# Patient Record
Sex: MALE | Race: WHITE | Employment: UNEMPLOYED | URBAN - METROPOLITAN AREA
[De-identification: names, ages, dates, MRNs, and addresses within clinical notes are randomized per-mention and may not be internally consistent; named-entity substitution may affect disease eponyms.]

---

## 2019-02-13 ENCOUNTER — HOSPITAL ENCOUNTER (OUTPATIENT)
Dept: GENERAL RADIOLOGY | Age: 44
Discharge: HOME | End: 2019-02-13
Payer: COMMERCIAL

## 2019-02-13 DIAGNOSIS — R05.9 COUGH: ICD-10-CM

## 2019-02-13 PROCEDURE — 71046 X-RAY EXAM CHEST 2 VIEWS: CPT

## 2022-06-10 ENCOUNTER — HOSPITAL ENCOUNTER (OUTPATIENT)
Age: 47
Discharge: HOME | End: 2022-06-10
Payer: COMMERCIAL

## 2022-06-10 ENCOUNTER — HOSPITAL ENCOUNTER (OUTPATIENT)
Dept: GENERAL RADIOLOGY | Age: 47
Discharge: HOME | End: 2022-06-10
Payer: COMMERCIAL

## 2022-06-10 DIAGNOSIS — G89.29 CHRONIC MIDLINE LOW BACK PAIN WITHOUT SCIATICA: ICD-10-CM

## 2022-06-10 DIAGNOSIS — M54.50 CHRONIC MIDLINE LOW BACK PAIN WITHOUT SCIATICA: ICD-10-CM

## 2022-06-10 PROCEDURE — 72100 X-RAY EXAM L-S SPINE 2/3 VWS: CPT

## 2022-06-10 PROCEDURE — 71046 X-RAY EXAM CHEST 2 VIEWS: CPT

## 2022-06-23 ENCOUNTER — HOSPITAL ENCOUNTER (OUTPATIENT)
Dept: PULMONOLOGY | Age: 47
Discharge: HOME | End: 2022-06-23
Payer: COMMERCIAL

## 2022-06-23 VITALS — OXYGEN SATURATION: 95 %

## 2022-06-23 DIAGNOSIS — G89.29 CHRONIC MIDLINE LOW BACK PAIN WITHOUT SCIATICA: ICD-10-CM

## 2022-06-23 DIAGNOSIS — M54.50 CHRONIC MIDLINE LOW BACK PAIN WITHOUT SCIATICA: ICD-10-CM

## 2022-06-23 LAB
EXPIRATORY TIME-POST: NORMAL
EXPIRATORY TIME: NORMAL
FEF 25-75% %CHNG: NORMAL
FEF 25-75% %PRED-POST: NORMAL
FEF 25-75% %PRED-PRE: NORMAL
FEF 25-75% PRED: NORMAL
FEF 25-75%-POST: NORMAL
FEF 25-75%-PRE: NORMAL
FEV1 %PRED-POST: 59 %
FEV1 %PRED-PRE: 69 %
FEV1 PRED: NORMAL
FEV1-POST: NORMAL
FEV1-PRE: NORMAL
FEV1/FVC %PRED-POST: NORMAL
FEV1/FVC %PRED-PRE: NORMAL
FEV1/FVC PRED: NORMAL
FEV1/FVC-POST: 84 %
FEV1/FVC-PRE: 82 %
FVC %PRED-POST: NORMAL
FVC %PRED-PRE: NORMAL
FVC PRED: NORMAL
FVC-POST: NORMAL
FVC-PRE: NORMAL
PEF %PRED-POST: NORMAL
PEF %PRED-PRE: NORMAL
PEF PRED: NORMAL
PEF%CHNG: NORMAL
PEF-POST: NORMAL
PEF-PRE: NORMAL

## 2022-06-23 PROCEDURE — 94760 N-INVAS EAR/PLS OXIMETRY 1: CPT

## 2022-06-23 PROCEDURE — 6370000000 HC RX 637 (ALT 250 FOR IP): Performed by: PHYSICIAN ASSISTANT

## 2022-06-23 PROCEDURE — 94640 AIRWAY INHALATION TREATMENT: CPT

## 2022-06-23 PROCEDURE — 94060 EVALUATION OF WHEEZING: CPT

## 2022-06-23 RX ORDER — ALBUTEROL SULFATE 90 UG/1
4 AEROSOL, METERED RESPIRATORY (INHALATION) ONCE
Status: COMPLETED | OUTPATIENT
Start: 2022-06-23 | End: 2022-06-23

## 2022-06-23 RX ADMIN — Medication 4 PUFF: at 13:40

## 2022-06-23 ASSESSMENT — PULMONARY FUNCTION TESTS
FEV1/FVC_POST: 84
FEV1_PERCENT_PREDICTED_POST: 59
FEV1/FVC_PRE: 82
FEV1_PERCENT_PREDICTED_PRE: 69

## 2022-06-28 PROCEDURE — 94060 EVALUATION OF WHEEZING: CPT | Performed by: INTERNAL MEDICINE

## 2022-06-28 NOTE — PROCEDURES
[address]                 [address] [address]                               PULMONARY FUNCTION    PATIENT NAME: Chiki Cotto                    :        1975  MED REC NO:   [id number]                          ROOM:  ACCOUNT NO:   [de-identified]                           ADMIT DATE: 2022  PROVIDER:     Nisha Young MD    DATE OF PROCEDURE:  2022    FINDINGS:  1. Spirometry:  FVC 3.13, which is 67 of predicted. FEV1 is 2.57,  which is 68 of predicted. No bronchodilator response. FEV1/FVC of 81. IMPRESSION:  This is nonspecific spirometry. For further evaluation,  clinical and radiological correlation indicated. Full PFT needed. There is no bronchodilator response.         Zoltan Felder MD    D: 2022 13:00:53       T: 2022 22:49:18     [id number]  Job#: [id number]     Doc#: [id number]    CC:

## 2022-09-15 ENCOUNTER — HOSPITAL ENCOUNTER (OUTPATIENT)
Dept: GENERAL RADIOLOGY | Age: 47
Discharge: HOME | End: 2022-09-15
Payer: COMMERCIAL

## 2022-09-15 DIAGNOSIS — R07.81 PLEURITIC CHEST PAIN: ICD-10-CM

## 2022-09-15 PROCEDURE — 71046 X-RAY EXAM CHEST 2 VIEWS: CPT

## 2022-09-23 ENCOUNTER — HOSPITAL ENCOUNTER (OUTPATIENT)
Dept: GENERAL RADIOLOGY | Age: 47
Discharge: HOME | End: 2022-09-23
Payer: COMMERCIAL

## 2022-09-23 ENCOUNTER — HOSPITAL ENCOUNTER (OUTPATIENT)
Age: 47
Discharge: HOME | End: 2022-09-23
Payer: COMMERCIAL

## 2022-09-23 DIAGNOSIS — R07.81 ANTERIOR PLEURITIC PAIN: ICD-10-CM

## 2022-09-23 PROCEDURE — 71046 X-RAY EXAM CHEST 2 VIEWS: CPT

## 2022-10-08 ENCOUNTER — HOSPITAL ENCOUNTER (OUTPATIENT)
Dept: CT IMAGING | Age: 47
Discharge: HOME | End: 2022-10-08
Payer: COMMERCIAL

## 2022-10-08 DIAGNOSIS — S22.31XA CLOSED TRAUMATIC DISPLACED FRACTURE OF RIB ON RIGHT SIDE, INITIAL ENCOUNTER: ICD-10-CM

## 2022-10-08 PROCEDURE — 71250 CT THORAX DX C-: CPT

## 2022-10-21 ENCOUNTER — HOSPITAL ENCOUNTER (EMERGENCY)
Age: 47
Discharge: HOME | End: 2022-10-21
Attending: STUDENT IN AN ORGANIZED HEALTH CARE EDUCATION/TRAINING PROGRAM
Payer: COMMERCIAL

## 2022-10-21 ENCOUNTER — APPOINTMENT (OUTPATIENT)
Dept: CT IMAGING | Age: 47
End: 2022-10-21
Payer: COMMERCIAL

## 2022-10-21 ENCOUNTER — APPOINTMENT (OUTPATIENT)
Dept: GENERAL RADIOLOGY | Age: 47
End: 2022-10-21
Payer: COMMERCIAL

## 2022-10-21 VITALS
SYSTOLIC BLOOD PRESSURE: 130 MMHG | WEIGHT: 197 LBS | BODY MASS INDEX: 30.92 KG/M2 | HEART RATE: 82 BPM | DIASTOLIC BLOOD PRESSURE: 92 MMHG | RESPIRATION RATE: 18 BRPM | OXYGEN SATURATION: 96 % | TEMPERATURE: 97.9 F | HEIGHT: 67 IN

## 2022-10-21 DIAGNOSIS — I10 ESSENTIAL HYPERTENSION: ICD-10-CM

## 2022-10-21 DIAGNOSIS — R51.9 ACUTE NONINTRACTABLE HEADACHE, UNSPECIFIED HEADACHE TYPE: Primary | ICD-10-CM

## 2022-10-21 LAB
A/G RATIO: 1.6 (ref 1.1–2.2)
ALBUMIN SERPL-MCNC: 4.4 G/DL (ref 3.4–5)
ALP BLD-CCNC: 150 U/L (ref 40–129)
ALT SERPL-CCNC: 41 U/L (ref 10–40)
ANION GAP SERPL CALCULATED.3IONS-SCNC: 10 MMOL/L (ref 3–16)
AST SERPL-CCNC: 26 U/L (ref 15–37)
BASOPHILS ABSOLUTE: 0.1 K/UL (ref 0–0.2)
BASOPHILS RELATIVE PERCENT: 0.9 %
BILIRUB SERPL-MCNC: 0.4 MG/DL (ref 0–1)
BUN BLDV-MCNC: 12 MG/DL (ref 7–20)
CALCIUM SERPL-MCNC: 9.5 MG/DL (ref 8.3–10.6)
CHLORIDE BLD-SCNC: 104 MMOL/L (ref 99–110)
CO2: 24 MMOL/L (ref 21–32)
CREAT SERPL-MCNC: 0.8 MG/DL (ref 0.9–1.3)
EKG ATRIAL RATE: 87 BPM
EKG DIAGNOSIS: NORMAL
EKG P AXIS: 50 DEGREES
EKG P-R INTERVAL: 170 MS
EKG Q-T INTERVAL: 364 MS
EKG QRS DURATION: 90 MS
EKG QTC CALCULATION (BAZETT): 438 MS
EKG R AXIS: 12 DEGREES
EKG T AXIS: 28 DEGREES
EKG VENTRICULAR RATE: 87 BPM
EOSINOPHILS ABSOLUTE: 0.4 K/UL (ref 0–0.6)
EOSINOPHILS RELATIVE PERCENT: 4.6 %
GFR SERPL CREATININE-BSD FRML MDRD: >60 ML/MIN/{1.73_M2}
GLUCOSE BLD-MCNC: 103 MG/DL (ref 70–99)
HCT VFR BLD CALC: 42.2 % (ref 40.5–52.5)
HEMOGLOBIN: 14.3 G/DL (ref 13.5–17.5)
INFLUENZA A: NOT DETECTED
INFLUENZA B: NOT DETECTED
LYMPHOCYTES ABSOLUTE: 2.4 K/UL (ref 1–5.1)
LYMPHOCYTES RELATIVE PERCENT: 26.5 %
MCH RBC QN AUTO: 30 PG (ref 26–34)
MCHC RBC AUTO-ENTMCNC: 34 G/DL (ref 31–36)
MCV RBC AUTO: 88.2 FL (ref 80–100)
MONOCYTES ABSOLUTE: 0.9 K/UL (ref 0–1.3)
MONOCYTES RELATIVE PERCENT: 10 %
NEUTROPHILS ABSOLUTE: 5.2 K/UL (ref 1.7–7.7)
NEUTROPHILS RELATIVE PERCENT: 58 %
PDW BLD-RTO: 14.1 % (ref 12.4–15.4)
PLATELET # BLD: 363 K/UL (ref 135–450)
PMV BLD AUTO: 7.1 FL (ref 5–10.5)
POTASSIUM REFLEX MAGNESIUM: 4 MMOL/L (ref 3.5–5.1)
RBC # BLD: 4.78 M/UL (ref 4.2–5.9)
SARS-COV-2 RNA, RT PCR: NOT DETECTED
SODIUM BLD-SCNC: 138 MMOL/L (ref 136–145)
TOTAL PROTEIN: 7.2 G/DL (ref 6.4–8.2)
TROPONIN: <0.01 NG/ML
WBC # BLD: 9 K/UL (ref 4–11)

## 2022-10-21 PROCEDURE — 2500000003 HC RX 250 WO HCPCS: Performed by: STUDENT IN AN ORGANIZED HEALTH CARE EDUCATION/TRAINING PROGRAM

## 2022-10-21 PROCEDURE — 70450 CT HEAD/BRAIN W/O DYE: CPT

## 2022-10-21 PROCEDURE — 99285 EMERGENCY DEPT VISIT HI MDM: CPT

## 2022-10-21 PROCEDURE — 84484 ASSAY OF TROPONIN QUANT: CPT

## 2022-10-21 PROCEDURE — 36415 COLL VENOUS BLD VENIPUNCTURE: CPT

## 2022-10-21 PROCEDURE — 93005 ELECTROCARDIOGRAM TRACING: CPT | Performed by: STUDENT IN AN ORGANIZED HEALTH CARE EDUCATION/TRAINING PROGRAM

## 2022-10-21 PROCEDURE — 71045 X-RAY EXAM CHEST 1 VIEW: CPT

## 2022-10-21 PROCEDURE — 80053 COMPREHEN METABOLIC PANEL: CPT

## 2022-10-21 PROCEDURE — 87636 SARSCOV2 & INF A&B AMP PRB: CPT

## 2022-10-21 PROCEDURE — 93010 ELECTROCARDIOGRAM REPORT: CPT | Performed by: INTERNAL MEDICINE

## 2022-10-21 PROCEDURE — 85025 COMPLETE CBC W/AUTO DIFF WBC: CPT

## 2022-10-21 PROCEDURE — 96374 THER/PROPH/DIAG INJ IV PUSH: CPT

## 2022-10-21 RX ORDER — LABETALOL HYDROCHLORIDE 5 MG/ML
10 INJECTION, SOLUTION INTRAVENOUS ONCE
Status: COMPLETED | OUTPATIENT
Start: 2022-10-21 | End: 2022-10-21

## 2022-10-21 RX ADMIN — LABETALOL HYDROCHLORIDE 10 MG: 5 INJECTION, SOLUTION INTRAVENOUS at 10:14

## 2022-10-21 ASSESSMENT — PAIN - FUNCTIONAL ASSESSMENT
PAIN_FUNCTIONAL_ASSESSMENT: NONE - DENIES PAIN
PAIN_FUNCTIONAL_ASSESSMENT: NONE - DENIES PAIN

## 2022-10-21 NOTE — ED PROVIDER NOTES
Emergency Department Encounter    Patient: Isaiah Yan  MRN: [id number]  : 1975  Date of Evaluation: 10/21/2022  ED Provider:  Darrell Mixon MD    Triage Chief Complaint:   Headache (Began while at counseling session today \"felt like a tingling in center of forehead\", states that he has a history of high BP. /109 in EMS.)    [place]:  Isaiah Yan is a 40 y.o. male with history seen below presenting with headache. Patient states he was at his counselors today and began having a bifrontal headache with mild tingling in this region. States it was both sides of his forehead and lasted about an hour. States the pain was mild, pressure/throbbing without obvious exacerbating or alleviating factors. He states he had no blurred vision, double vision, focal neurodeficits, motor or sensory changes, lightheadedness or dizziness. States he has had no recent falls or trauma. His blood pressure was elevated 167/109 for EMS. Patient states that the headache has resolved completely. Denies any symptoms currently. States he did have coffee this morning and will get the symptoms after coughing at times. States he feels at baseline. Denies any chest pain, shortness of breath, lightheadedness or dizziness. His neck or back pain. Denies fevers or chills. Denies cough or sputum production. Denies abdominal pain, nausea vomiting, diarrhea constipation, urinary symptoms include dysuria, increased frequency, urgency, hematuria. States he wish as they did not send him in but he stated with his blood pressure being elevated that he needed to be seen.     ROS - see HPI, below listed is current ROS at time of my eval:  At least 14 systems reviewed, negative other HPI    Past Medical History:   Diagnosis Date    Chronic kidney disease     Hepatitis C     Psychiatric problem     Schizophrenia ([facility] [address]     Seizures ([facility] [address]      Past Surgical History:   Procedure Laterality Date    LITHOTRIPSY       Family History Problem Relation Age of Onset    Mental Illness Mother     Heart Disease Mother      Social History     Socioeconomic History    Marital status: Single     Spouse name: Not on file    Number of children: Not on file    Years of education: Not on file    Highest education level: Not on file   Occupational History    Not on file   Tobacco Use    Smoking status: Every Day     Packs/day: 0.50     Years: 15.00     Pack years: 7.50     Types: Cigarettes    Smokeless tobacco: Never   Vaping Use    Vaping Use: Every day    Substances: Nicotine   Substance and Sexual Activity    Alcohol use: No    Drug use: Not Currently     Types: Marijuana Alayna Coad)    Sexual activity: Never   Other Topics Concern    Not on file   Social History Narrative    Not on file     Social Determinants of Health     Financial Resource Strain: Not on file   Food Insecurity: Not on file   Transportation Needs: Not on file   Physical Activity: Not on file   Stress: Not on file   Social Connections: Not on file   Intimate Partner Violence: Not on file   Housing Stability: Not on file     Current Facility-Administered Medications   Medication Dose Route Frequency Provider Last Rate Last Admin    labetalol (NORMODYNE;TRANDATE) injection 10 mg  10 mg IntraVENous Once Andres Grewal MD         Current Outpatient Medications   Medication Sig Dispense Refill    traZODone (DESYREL) 100 MG tablet Take 100 mg by mouth nightly.      haloperidol (HALDOL) 5 MG tablet Take 1 tablet by mouth 2 times daily for 14 days. Indications: Fright, psychosis 28 tablet 1    hydrOXYzine (VISTARIL) 50 MG capsule Take 1 capsule by mouth 3 times daily as needed for Anxiety (or as needed for sleep). Indications: Feeling Anxious, Trouble Sleeping due to Feeling Anxious 60 capsule 1    benztropine (COGENTIN) 1 MG tablet Take 1 tablet by mouth 2 times daily. Indications:  Motor Restlessness, eps 60 tablet 1    Multiple Vitamins-Minerals (THERAPEUTIC MULTIVITAMIN-MINERALS) tablet Take 1 tablet by mouth daily. Indications: Disorder in Nutrition 30 tablet 11    ergocalciferol (ERGOCALCIFEROL) 03398 UNITS capsule Take one WEEKLY until all are gone  Indications: Vitamin D Deficiency 5 capsule 0    haloperidol decanoate (HALDOL DECANOATE) 50 MG/ML injection Inject 1 mL into the muscle every 30 days. Indications: psychosis 1 mL 0     No Known Allergies    Nursing Notes Reviewed    Physical Exam:  Triage VS:    ED Triage Vitals [10/21/22 0929]   Enc Vitals Group      BP (!) 183/153      Heart Rate 93      Resp 18      Temp 97.9 °F (36.6 °C)      Temp Source Oral      SpO2 95 %      Weight 197 lb (89.4 kg)      Height 5' 7\" (1.702 m)      Head Circumference       Peak Flow       Pain Score       Pain Loc       Pain Edu? Excl. in [address]? My pulse ox interpretation is - normal    General appearance:  No acute distress. Skin:  Warm. Dry. Eye:  Extraocular movements intact. Ears, nose, mouth and throat:  Oral mucosa moist   Neck:  Trachea midline. No tenderness palpation over the CTL spine  Extremity:  No swelling. Normal ROM     Heart:  Regular rate and rhythm, normal S1 & S2, no extra heart sounds. Perfusion:  intact  Respiratory:  Lungs clear to auscultation bilaterally. Respirations nonlabored. Abdominal:  Normal bowel sounds. Soft. Nontender. Non distended. Back:  No CVA tenderness to palpation     Neurological:  Alert and oriented times 3. No focal neuro deficits.    No facial asymmetry, normal sensation light touch of the face, extraocular eye movements are intact, pupils are 3 mm reactive bilaterally, no pronator drift of the bilateral upper and lower extremities, normal sensation light touch of the bilateral upper and lower extremities, normal finger-nose-finger and heel shin, no dysarthria or dysphagia no neglect walking without difficulty, no signs of ataxia, full NIH performed and is 0          Psychiatric:  Appropriate    I have reviewed and interpreted all of the currently available lab results from this visit (if applicable):  Results for orders placed or performed during the hospital encounter of 10/21/22   CBC with Auto Differential   Result Value Ref Range    WBC 9.0 4.0 - 11.0 K/uL    RBC 4.78 4.20 - 5.90 M/uL    Hemoglobin 14.3 13.5 - 17.5 g/dL    Hematocrit 42.2 40.5 - 52.5 %    MCV 88.2 80.0 - 100.0 fL    MCH 30.0 26.0 - 34.0 pg    MCHC 34.0 31.0 - 36.0 g/dL    RDW 14.1 12.4 - 15.4 %    Platelets 028 079 - 412 K/uL    MPV 7.1 5.0 - 10.5 fL    Neutrophils % 58.0 %    Lymphocytes % 26.5 %    Monocytes % 10.0 %    Eosinophils % 4.6 %    Basophils % 0.9 %    Neutrophils Absolute 5.2 1.7 - 7.7 K/uL    Lymphocytes Absolute 2.4 1.0 - 5.1 K/uL    Monocytes Absolute 0.9 0.0 - 1.3 K/uL    Eosinophils Absolute 0.4 0.0 - 0.6 K/uL    Basophils Absolute 0.1 0.0 - 0.2 K/uL   EKG 12 Lead   Result Value Ref Range    Ventricular Rate 87 BPM    Atrial Rate 87 BPM    P-R Interval 170 ms    QRS Duration 90 ms    Q-T Interval 364 ms    QTc Calculation (Bazett) 438 ms    P Axis 50 degrees    R Axis 12 degrees    T Axis 28 degrees    Diagnosis       Normal sinus rhythmPossible Left atrial enlargementBorderline ECGWhen compared with ECG of 18-MAR-2014 10:28,Questionable change in QRS axis      Radiographs (if obtained):  Radiologist's Report Reviewed:  No results found. EKG (if obtained): (All EKG's are interpreted by myself in the absence of a cardiologist)  Normal sinus rhythm, ventricular rate 87, UT interval 170, QRS duration 90, QTc 438, no significant ST elevation or depression, no significant ischemic change    MDM:    60-year-old male presenting with history seen above. Patient is hypertensive at 171/121 on presentation but otherwise vitals are reassuring and patient afebrile satting well on room air. Physical exam can be seen above. CBC, CMP, Trope, COVID as well as EKG and CT head obtained. CBC is reassuring without leukocytosis. Hemoglobin is stable.   CMP is a very mildly elevated alk phos at 150. AST and ALT are similar to prior exams. Patient has no epigastric or right upper quadrant abdominal pain. EKG is nonacute. Katarina Delcid was within normal limits. Rapid flu and COVID are negative. CT head and chest x-ray also nonacute other than trace fluid in the mastoids with trace mucosal thickening in the paranasal sinuses. TMs clear, oropharynx clear patient has no mastoid tenderness palpation. Patient given labetalol in the ED for blood pressure and improved to 130/92. Patient monitored in the ED for several hours with no repeat headache and no repeat symptoms. Patient states he continues to feel baseline. Patient states he feels safe for discharge and would like to go home. I discussed tricked return precautions with patient as well as close follow-up and patient agreeable plan to discharge home    Clinical Impression:  1. Acute nonintractable headache, unspecified headache type    2. Essential hypertension             Comment: Please note this report has been produced using speech recognition software and may contain errors related to that system including errors in grammar, punctuation, and spelling, as well as words and phrases that may be inappropriate. Efforts were made to edit the dictations.         Judith Yap MD  10/21/22 7688

## 2022-10-21 NOTE — DISCHARGE INSTRUCTIONS
Follow-up with your primary doctor early next week for reevaluation. Return to emergency department for return of headache, blurred vision, focal neurodeficits, motor or sensory changes, weakness, numbness, blurred vision, chest pain or shortness of breath, lightheadedness or dizziness or any new changing or worsening symptoms. We are always here for reevaluation never hesitate to return.

## 2022-10-21 NOTE — ED NOTES
Pt stable, no complaints. Dc home, family to set up private transport to take pt home. Ambulatory to lobby to wait on ride.      Margaret Man RN  10/21/22 2376

## 2023-03-09 ENCOUNTER — OFFICE VISIT (OUTPATIENT)
Dept: SLEEP MEDICINE | Age: 48
End: 2023-03-09
Payer: COMMERCIAL

## 2023-03-09 VITALS
RESPIRATION RATE: 18 BRPM | HEIGHT: 67 IN | SYSTOLIC BLOOD PRESSURE: 115 MMHG | WEIGHT: 199 LBS | BODY MASS INDEX: 31.23 KG/M2 | HEART RATE: 91 BPM | DIASTOLIC BLOOD PRESSURE: 70 MMHG | OXYGEN SATURATION: 95 %

## 2023-03-09 DIAGNOSIS — F51.5 NIGHTMARES: ICD-10-CM

## 2023-03-09 DIAGNOSIS — Z72.0 TOBACCO ABUSE: ICD-10-CM

## 2023-03-09 DIAGNOSIS — R06.83 SNORING: Primary | ICD-10-CM

## 2023-03-09 DIAGNOSIS — G47.30 OBSERVED SLEEP APNEA: ICD-10-CM

## 2023-03-09 DIAGNOSIS — Z78.9 EXCESSIVE CAFFEINE INTAKE: ICD-10-CM

## 2023-03-09 DIAGNOSIS — R51.9 MORNING HEADACHE: ICD-10-CM

## 2023-03-09 DIAGNOSIS — R53.83 OTHER FATIGUE: ICD-10-CM

## 2023-03-09 DIAGNOSIS — F51.04 PSYCHOPHYSIOLOGICAL INSOMNIA: ICD-10-CM

## 2023-03-09 DIAGNOSIS — Z72.821 POOR SLEEP HYGIENE: ICD-10-CM

## 2023-03-09 DIAGNOSIS — I10 PRIMARY HYPERTENSION: ICD-10-CM

## 2023-03-09 DIAGNOSIS — E66.9 OBESITY (BMI 30-39.9): ICD-10-CM

## 2023-03-09 PROCEDURE — 3078F DIAST BP <80 MM HG: CPT | Performed by: NURSE PRACTITIONER

## 2023-03-09 PROCEDURE — 3074F SYST BP LT 130 MM HG: CPT | Performed by: NURSE PRACTITIONER

## 2023-03-09 PROCEDURE — 99204 OFFICE O/P NEW MOD 45 MIN: CPT | Performed by: NURSE PRACTITIONER

## 2023-03-09 RX ORDER — CETIRIZINE HYDROCHLORIDE 10 MG/1
10 TABLET ORAL DAILY
COMMUNITY

## 2023-03-09 RX ORDER — ATORVASTATIN CALCIUM 40 MG/1
40 TABLET, FILM COATED ORAL
COMMUNITY
Start: 2022-09-21

## 2023-03-09 RX ORDER — LISINOPRIL AND HYDROCHLOROTHIAZIDE 20; 12.5 MG/1; MG/1
20 TABLET ORAL
COMMUNITY
Start: 2023-01-03

## 2023-03-09 RX ORDER — PRAZOSIN HYDROCHLORIDE 2 MG/1
2 CAPSULE ORAL
COMMUNITY
Start: 2023-02-13

## 2023-03-09 RX ORDER — PANTOPRAZOLE SODIUM 40 MG/1
40 TABLET, DELAYED RELEASE ORAL
COMMUNITY
Start: 2023-02-13

## 2023-03-09 RX ORDER — QUETIAPINE FUMARATE 200 MG/1
200 TABLET, FILM COATED ORAL 2 TIMES DAILY
COMMUNITY

## 2023-03-09 RX ORDER — ZOLPIDEM TARTRATE 5 MG/1
5 TABLET ORAL NIGHTLY PRN
COMMUNITY

## 2023-03-09 ASSESSMENT — SLEEP AND FATIGUE QUESTIONNAIRES
ESS TOTAL SCORE: 4
HOW LIKELY ARE YOU TO NOD OFF OR FALL ASLEEP WHILE LYING DOWN TO REST IN THE AFTERNOON WHEN CIRCUMSTANCES PERMIT: 1
HOW LIKELY ARE YOU TO NOD OFF OR FALL ASLEEP WHILE SITTING QUIETLY AFTER LUNCH WITHOUT ALCOHOL: 1
HOW LIKELY ARE YOU TO NOD OFF OR FALL ASLEEP WHILE WATCHING TV: 1
HOW LIKELY ARE YOU TO NOD OFF OR FALL ASLEEP WHILE SITTING INACTIVE IN A PUBLIC PLACE: 1
NECK CIRCUMFERENCE (INCHES): 15
HOW LIKELY ARE YOU TO NOD OFF OR FALL ASLEEP WHILE SITTING AND READING: 0
HOW LIKELY ARE YOU TO NOD OFF OR FALL ASLEEP WHEN YOU ARE A PASSENGER IN A CAR FOR AN HOUR WITHOUT A BREAK: 0
HOW LIKELY ARE YOU TO NOD OFF OR FALL ASLEEP IN A CAR, WHILE STOPPED FOR A FEW MINUTES IN TRAFFIC: 0
HOW LIKELY ARE YOU TO NOD OFF OR FALL ASLEEP WHILE SITTING AND TALKING TO SOMEONE: 0

## 2023-03-09 NOTE — PATIENT INSTRUCTIONS
The [facility] and [address] at [address], [address] [facility], [place], [address] [place] [address]                  The Sleep center at [facility], [address] [place] [address], [place], [facility]                      Phone: [phone] Fax: [phone]                Dear [address] Patient,     For in-lab testing please, bring with you:  Appropriate nightclothes (pajamas, sweats, etc.), slippers and robe  All medications you will need during you stay, including any breathing medications, nebulizers and metered dose inhalers  Your own toiletries and hairdryer if you wish to shower before you leave  Current photo I.D. and Insurance card  We do not allow any pillows or bed linens from home due to health regulations  -We recommend that you not bring valuables with you to the Sleep Center, as we cannot be responsible for any lost or damaged personal items. Please be aware that [facility] is a non-smoking facility. There is no smoking allowed anywhere on [facility] at any time. Each patient room is a private room with a private bathroom. The in-lab test itself consist of electrodes and sensors attached to specific areas of your scalp, face, chest and legs. We will also monitor respiratory effort, nasal and oral airflow and oxygen levels. The test will not hurt- it is painless and not invasive in any way. At home testing when you come to  the rental unit, please bring:   -I. D. and Insurance card  **Please note the Home Sleep Test Units are limited. It is a 1 night rental and must be dropped off the following day to ensure you are not billed a late or replacement fee**    Please refrain from or reduce the use of caffeine and/or alcohol prior to your sleep study and avoid napping the day of your study, as these will affect the accuracy of your test results.   If you are ill the day of your test (COVID-19, cold, upper respiratory infection, flu, etc.) please call to reschedule your test as the test will not be accurate, and for other patient safety. If you should have any questions or need to cancel or reschedule your appointment, please call the [facility] location.  (Even if your test has been scheduled at our [place] location)   [phone]      Thank you, [facility]

## 2023-03-09 NOTE — LETTER
March 9, 2023      No referring provider defined for this encounter. Patient: Tae Stark   MR Number: [id number]   YOB: 1975   Date of Visit: 3/9/2023       Dear Isacc العلي: Thank you for referring Bridget Chaudhari to me for evaluation/treatment. Below are the relevant portions of my assessment and plan of care. Assessment:       · Snoring  · Observed sleep apnea   · Fatigue  · Sleep onset and maintenance insomnia-psychophysiological hyperarousal, poor sleep hygiene, comorbid conditions, possible MANUELA likely contributing  · Nightmares  · Depression, anxiety, bipolar disorder-followed by psychiatry  · Morning headache  · Excessive caffeine intake  · Hypertension  · Obesity  · Tobacco and marijuana abuse        Plan:      · PSG to evaluate forsleep related breathing disorder, watch for parasomnias. · Treatment options were discussed with patient if PSG reveals MANUELA, including CPAP therapy, oral appliances and upper airway surgery. · Sleep hygiene  · Cognitive behavioral therapy was discussed with patient including stimulus control and sleep restriction  · Recommend set bed/wake times, no naps in the daytime, decrease time in bed. No eating or smoking at night. No TV in bed  · Continue to follow closely with psychiatry  · Recommend significantly decreasing caffeine intake especially after 1-2 PM  · Avoid sedatives, alcohol and caffeinated drinks at bed time. · No driving motorized vehicles or operating heavy machinery while fatigue, drowsy or sleepy. · Weight loss is also recommended as a long-term intervention. · Complications of MANUELA if not treated were discussed with patient patient to include systemic hypertension, pulmonary hypertension, cardiovascular morbidities, car accidents and all cause mortality.    Discussed pathophysiology of MANUELA with patient today   Patient education handout provided regarding sleep tips    Recommend tobacco and marijuana cessation If you have questions, please do not hesitate to call me. I look forward to following Pedro Luis Hill along with you.     Sincerely,        Estelle Mckeon, APRN - CNP    CC providers:    No Recipients

## 2023-03-09 NOTE — PROGRESS NOTES
Patient ID: Isai Hui is a 56 y.o. male who is being seen today for   Chief Complaint   Patient presents with    New Patient     Ref by Ruddy Riggs excessive daytime sleepiness, no previous sleep study      Referring: Roxana Aquino PA-C    HPI:   Isai Hui is a 56 y.o. male in office with cousin for excessive daytime sleepiness evaluation. Patient reports snoring at night for the past 10+ years. Doesn't sleep in in supine position. Wakes self snoring. Has witnessed apnea. Wakes up at night choking and gasping for air. No restorative sleep. +dry mouth upon awakening. Fatigue and tiredness during the day. No history of sleep apnea. Bedtime 8 pm and rise time is 8--9 am. It takes 60 minutes to fall asleep- lays there. 4 nocturia. Wakes up 5-6 times at night. It takes unknown minutes to fall back a sleep. Takes 2-3 nap during the day ( 120 minutes). +headache in am.  No driving. Gained 50 pounds in the past 2 years. No forgetfulness or decreased concentration. Drinks 2 liter soda day and a pot of coffee  day. No alcohol. No restless feelings in legs at night. No teeth grinding. +nightmares- takes prazosin with minimal benefit, yells in dreams, states unsure if acts out dreams, states does not remember doing this, denies injuries. States dreams are bothersome. No hallucinations when waking asleep or falling asleep. No sleep paralysis. No cataplexy. No sleep walking. No night time panic attacks. No narcotics. - Smokes marijuana, states no other drug abuse. +history of depression. +history of anxiety, bipolar disorder, sees psychiatry. No history of atrial fibrillation. No history of DM. No history of HTN. +history of ischemic heart disease. No history of stroke. ESS is 4 . +smoking. No known FH for MANUELA, RLS or narcolepsy.      Sleep Medicine 3/9/2023   Sitting and reading 0   Watching TV 1   Sitting, inactive in a public place (e.g. a theatre or a meeting) 1   As a passenger in a car for an hour without a break 0   Lying down to rest in the afternoon when circumstances permit 1   Sitting and talking to someone 0   Sitting quietly after a lunch without alcohol 1   In a car, while stopped for a few minutes in traffic 0   [place] Sleepiness Score 4   Neck circumference (Inches) 15       Past Medical History:  Past Medical History:   Diagnosis Date    Chronic kidney disease     Hepatitis C     Psychiatric problem     Schizophrenia ([facility] [address]     Seizures ([facility] [address]        Past Surgical History:        Procedure Laterality Date    LITHOTRIPSY         Allergies:  has No Known Allergies. Social History:    TOBACCO:   reports that he has been smoking cigarettes. He has a 15.00 pack-year smoking history. He has never used smokeless tobacco.  ETOH:   reports no history of alcohol use. Family History:       Problem Relation Age of Onset    Mental Illness Mother     Heart Disease Mother        Current Medications:    Current Outpatient Medications:     atorvastatin (LIPITOR) 40 MG tablet, Take 40 mg by mouth, Disp: , Rfl:     cetirizine (ZYRTEC) 10 MG tablet, Take 10 mg by mouth daily, Disp: , Rfl:     lisinopril-hydroCHLOROthiazide (PRINZIDE;ZESTORETIC) 20-12.5 MG per tablet, Take 20 tablets by mouth, Disp: , Rfl:     pantoprazole (PROTONIX) 40 MG tablet, Take 40 mg by mouth, Disp: , Rfl:     prazosin (MINIPRESS) 2 MG capsule, Take 2 mg by mouth, Disp: , Rfl:     QUEtiapine (SEROQUEL) 200 MG tablet, Take 200 mg by mouth 2 times daily, Disp: , Rfl:     zolpidem (AMBIEN) 5 MG tablet, Take 5 mg by mouth nightly as needed for Sleep., Disp: , Rfl:     hydrOXYzine (VISTARIL) 50 MG capsule, Take 1 capsule by mouth 3 times daily as needed for Anxiety (or as needed for sleep). Indications: Feeling Anxious, Trouble Sleeping due to Feeling Anxious, Disp: 60 capsule, Rfl: 1    Multiple Vitamins-Minerals (THERAPEUTIC MULTIVITAMIN-MINERALS) tablet, Take 1 tablet by mouth daily.  Indications: Disorder in Nutrition, Disp: 30 tablet, Rfl: 11 ergocalciferol (ERGOCALCIFEROL) 52239 UNITS capsule, Take one WEEKLY until all are gone  Indications: Vitamin D Deficiency, Disp: 5 capsule, Rfl: 0    traZODone (DESYREL) 100 MG tablet, Take 100 mg by mouth nightly. (Patient not taking: Reported on 3/9/2023), Disp: , Rfl:     haloperidol (HALDOL) 5 MG tablet, Take 1 tablet by mouth 2 times daily for 14 days. Indications: Fright, psychosis, Disp: 28 tablet, Rfl: 1    benztropine (COGENTIN) 1 MG tablet, Take 1 tablet by mouth 2 times daily. Indications: Motor Restlessness, eps (Patient not taking: Reported on 3/9/2023), Disp: 60 tablet, Rfl: 1    haloperidol decanoate (HALDOL DECANOATE) 50 MG/ML injection, Inject 1 mL into the muscle every 30 days. Indications: psychosis (Patient not taking: Reported on 3/9/2023), Disp: 1 mL, Rfl: 0        Review of Systems  Constitutional: Negative for fever  HENT: Negative for sore throat  Eyes: Negative for redness   Respiratory: Negative for dyspnea, cough  Cardiovascular: Negative for chest pain  Gastrointestinal: Negative for vomiting, diarrhea   Genitourinary: Negative for hematuria   Musculoskeletal: Negative for arthralgias   Skin: Negative for rash  Neurological: Negative for syncope  Hematological: Negative for adenopathy  Psychiatric/Behavorial: Negative for anxiety      Objective:   PHYSICAL EXAM:  /70 (Site: Left Upper Arm, Position: Sitting, Cuff Size: Medium Adult)   Pulse 91   Resp 18   Ht 5' 7\" (1.702 m)   Wt 199 lb (90.3 kg)   SpO2 95% Comment: RA  BMI 31.17 kg/m²     Physical Exam  Gen: No acute distress. Obese. BMI of 31.17  Eyes: PERRL. No sclera icterus. No conjunctival injection. ENT: No discharge. Pharynx clear. Mallampati class III  Neck: Trachea midline. No obvious mass. Neck circumference 15\"  Resp: No accessory muscle use. Nocrackles. No wheezes. No rhonchi. CV: Regular rate. Regular rhythm. No murmur or rub. Skin: Warm and dry. M/S: No cyanosis. No obvious joint deformity.    Neuro: Awake. Alert. Moves all four extremities. Psych: Oriented x 3. No anxiety. DATA:   1/21/2022 Hgb 14.3  10/21/2022 CO2 24      Assessment:       Snoring  Observed sleep apnea   Fatigue  Sleep onset and maintenance insomnia-psychophysiological hyperarousal, poor sleep hygiene, comorbid conditions, possible MANUELA likely contributing  Nightmares  Depression, anxiety, bipolar disorder-followed by psychiatry  Morning headache  Excessive caffeine intake  Hypertension  Obesity  Tobacco and marijuana abuse        Plan:      PSG to evaluate forsleep related breathing disorder, watch for parasomnias. Treatment options were discussed with patient if PSG reveals MANUELA, including CPAP therapy, oral appliances and upper airway surgery. Sleep hygiene  Cognitive behavioral therapy was discussed with patient including stimulus control and sleep restriction  Recommend set bed/wake times, no naps in the daytime, decrease time in bed. No eating or smoking at night. No TV in bed  Continue to follow closely with psychiatry  Recommend significantly decreasing caffeine intake especially after 1-2 PM  Avoid sedatives, alcohol and caffeinated drinks at bed time. No driving motorized vehicles or operating heavy machinery while fatigue, drowsy or sleepy. Weight loss is also recommended as a long-term intervention. Complications of MANUELA if not treated were discussed with patient patient to include systemic hypertension, pulmonary hypertension, cardiovascular morbidities, car accidents and all cause mortality.   Discussed pathophysiology of MANUELA with patient today  Patient education handout provided regarding sleep tips   Recommend tobacco and marijuana cessation

## 2023-05-22 ENCOUNTER — TELEPHONE (OUTPATIENT)
Dept: PULMONOLOGY | Age: 48
End: 2023-05-22

## 2023-06-19 ENCOUNTER — HOSPITAL ENCOUNTER (OUTPATIENT)
Dept: SLEEP CENTER | Age: 48
Discharge: HOME | End: 2023-06-21
Payer: COMMERCIAL

## 2023-06-19 PROCEDURE — 95810 POLYSOM 6/> YRS 4/> PARAM: CPT

## 2023-06-20 PROBLEM — G47.30 OBSERVED SLEEP APNEA: Status: ACTIVE | Noted: 2023-06-20

## 2023-06-20 PROCEDURE — 95810 POLYSOM 6/> YRS 4/> PARAM: CPT | Performed by: INTERNAL MEDICINE

## 2023-06-23 ENCOUNTER — TELEPHONE (OUTPATIENT)
Dept: PULMONOLOGY | Age: 48
End: 2023-06-23

## 2023-06-23 DIAGNOSIS — G47.33 SEVERE OBSTRUCTIVE SLEEP APNEA: Primary | ICD-10-CM

## 2023-08-04 ENCOUNTER — HOSPITAL ENCOUNTER (OUTPATIENT)
Dept: SLEEP CENTER | Age: 48
Discharge: HOME | End: 2023-08-06
Payer: COMMERCIAL

## 2023-08-04 DIAGNOSIS — G47.33 SEVERE OBSTRUCTIVE SLEEP APNEA: ICD-10-CM

## 2023-08-04 PROCEDURE — 95811 POLYSOM 6/>YRS CPAP 4/> PARM: CPT

## 2023-08-15 ENCOUNTER — TELEPHONE (OUTPATIENT)
Dept: PULMONOLOGY | Age: 48
End: 2023-08-15

## 2023-08-15 DIAGNOSIS — G47.33 SEVERE OBSTRUCTIVE SLEEP APNEA: Primary | ICD-10-CM

## 2023-08-15 NOTE — TELEPHONE ENCOUNTER
Chart/orders reviewed and signed.   Please make DME company aware that patient needs set up as soon as possible due to severity of sleep apnea

## 2023-08-15 NOTE — TELEPHONE ENCOUNTER
PAP orders. Need faxed, with testing/OV note/demographics/insurance, once signed, to [address].

## 2023-09-06 NOTE — TELEPHONE ENCOUNTER
Pt calling stating he feels pressure is too high feels like he's suffocating, he does have the humidifier working too will need to get CR

## 2023-09-08 NOTE — TELEPHONE ENCOUNTER
Reviewed data patient has used BiPAP for 3 days for a few minutes at a time. Report showing some mask leak. Ramp start was set at 9.4 cm H2O. I did change ramp start to 8 cm H2O. It is set up for 30 minutes. Mask leak may make pressure feel higher. He should get mask fitting at [facility] if needed. If he is feeling suffocated I may need to increase the ramp start pressure. Patient may need to sit with BiPAP on watching TV or reading to help him get used to it.   He should call back if he needs further adjustment

## 2023-09-13 NOTE — TELEPHONE ENCOUNTER
Pt returned call and was informed with verbal understanding. Pt mother is going to reach out to [facility] about changing the mask/doing mask fit.

## 2023-10-04 ENCOUNTER — TELEPHONE (OUTPATIENT)
Dept: PULMONOLOGY | Age: 48
End: 2023-10-04

## 2023-10-04 NOTE — TELEPHONE ENCOUNTER
Pt appt R/S she stated thatmahesh has tried to call the office and was on hold for the first time for 37 min and the second time was 17 min  She tried to cancel pts appt for today and could not get through

## 2023-10-04 NOTE — TELEPHONE ENCOUNTER
Patient did not show for 31-90 appointment  with Melony Nolan on 10/4/23    Same Day Cancellation: No    Patient rescheduled:  No    Patient was also no show on: n/a    LOV 3/9/23:    Assessment:       Snoring  Observed sleep apnea   Fatigue  Sleep onset and maintenance insomnia-psychophysiological hyperarousal, poor sleep hygiene, comorbid conditions, possible MANUELA likely contributing  Nightmares  Depression, anxiety, bipolar disorder-followed by psychiatry  Morning headache  Excessive caffeine intake  Hypertension  Obesity  Tobacco and marijuana abuse     Plan:      PSG to evaluate forsleep related breathing disorder, watch for parasomnias. Treatment options were discussed with patient if PSG reveals MANUELA, including CPAP therapy, oral appliances and upper airway surgery. Sleep hygiene  Cognitive behavioral therapy was discussed with patient including stimulus control and sleep restriction  Recommend set bed/wake times, no naps in the daytime, decrease time in bed. No eating or smoking at night. No TV in bed  Continue to follow closely with psychiatry  Recommend significantly decreasing caffeine intake especially after 1-2 PM  Avoid sedatives, alcohol and caffeinated drinks at bed time. No driving motorized vehicles or operating heavy machinery while fatigue, drowsy or sleepy. Weight loss is also recommended as a long-term intervention. Complications of MANUELA if not treated were discussed with patient patient to include systemic hypertension, pulmonary hypertension, cardiovascular morbidities, car accidents and all cause mortality.   Discussed pathophysiology of MANUELA with patient today  Patient education handout provided regarding sleep tips   Recommend tobacco and marijuana cessation

## 2023-10-14 PROBLEM — G47.33 SEVERE OBSTRUCTIVE SLEEP APNEA: Status: ACTIVE | Noted: 2023-06-20

## 2023-10-17 PROBLEM — E66.9 OBESITY (BMI 30-39.9): Status: ACTIVE | Noted: 2023-10-17

## 2023-10-25 ENCOUNTER — OFFICE VISIT (OUTPATIENT)
Dept: SLEEP MEDICINE | Age: 48
End: 2023-10-25
Payer: COMMERCIAL

## 2023-10-25 VITALS
HEIGHT: 67 IN | WEIGHT: 205.6 LBS | OXYGEN SATURATION: 96 % | BODY MASS INDEX: 32.27 KG/M2 | DIASTOLIC BLOOD PRESSURE: 82 MMHG | SYSTOLIC BLOOD PRESSURE: 119 MMHG | HEART RATE: 92 BPM

## 2023-10-25 DIAGNOSIS — Z78.9 DIFFICULTY WITH BIPAP USE: ICD-10-CM

## 2023-10-25 DIAGNOSIS — G47.33 SEVERE OBSTRUCTIVE SLEEP APNEA: Primary | ICD-10-CM

## 2023-10-25 DIAGNOSIS — Z71.89 ENCOUNTER FOR BIPAP USE COUNSELING: ICD-10-CM

## 2023-10-25 DIAGNOSIS — R53.83 OTHER FATIGUE: ICD-10-CM

## 2023-10-25 DIAGNOSIS — G47.10 HYPERSOMNIA: ICD-10-CM

## 2023-10-25 DIAGNOSIS — E66.9 OBESITY (BMI 30-39.9): ICD-10-CM

## 2023-10-25 PROCEDURE — 99214 OFFICE O/P EST MOD 30 MIN: CPT | Performed by: NURSE PRACTITIONER

## 2023-10-25 ASSESSMENT — SLEEP AND FATIGUE QUESTIONNAIRES
HOW LIKELY ARE YOU TO NOD OFF OR FALL ASLEEP WHILE SITTING AND TALKING TO SOMEONE: 0
HOW LIKELY ARE YOU TO NOD OFF OR FALL ASLEEP WHEN YOU ARE A PASSENGER IN A CAR FOR AN HOUR WITHOUT A BREAK: 0
HOW LIKELY ARE YOU TO NOD OFF OR FALL ASLEEP WHILE LYING DOWN TO REST IN THE AFTERNOON WHEN CIRCUMSTANCES PERMIT: 3
HOW LIKELY ARE YOU TO NOD OFF OR FALL ASLEEP WHILE SITTING AND READING: 3
HOW LIKELY ARE YOU TO NOD OFF OR FALL ASLEEP IN A CAR, WHILE STOPPED FOR A FEW MINUTES IN TRAFFIC: 0
HOW LIKELY ARE YOU TO NOD OFF OR FALL ASLEEP WHILE WATCHING TV: 2
NECK CIRCUMFERENCE (INCHES): 15.5
ESS TOTAL SCORE: 12
HOW LIKELY ARE YOU TO NOD OFF OR FALL ASLEEP WHILE SITTING QUIETLY AFTER LUNCH WITHOUT ALCOHOL: 2
HOW LIKELY ARE YOU TO NOD OFF OR FALL ASLEEP WHILE SITTING INACTIVE IN A PUBLIC PLACE: 2

## 2023-10-25 NOTE — PROGRESS NOTES
Patient ID: Belinda Cannon is a 56 y.o. male who is being seen today for   No chief complaint on file. Referring: Manuelito Hook PA-C    HPI:   Belinda Cannon is a 56 y.o. male in office with cousin for MANUELA follow up. PSG and PAP titration were reviewed by me and noted below. PSG showed severe MANUELA and patient started BiPAP after titration. Results were dicussed with patient and multiple good questions were answered. States BIPAP pressure is the biggest barrier to use. Patient is using BiPAP  <1 hrs/night. Using humidifier. No snoring on BiPAP. The pressure feels too high. The mask is somewhat comfortable- full face. No mask leak. +daytime sleepiness. No driving. No dry nose or throat. +fatigue. Bedtime is 9-10 pm and rise time is 730 am. Sleep onset is 30 minutes. Wakes up 3-4 times at night total. 3-4 nocturia. It takes few- unknown minutes to fall back a sleep. 2 naps during the day- 4-5 hours. No headache in am. No weight gain. 1 liter caffienated beverages during the day. Rare alcohol. ESS is 12        Initial HPI 3/9/23  Belinda Cannon is a 56 y.o. male in office with cousin for excessive daytime sleepiness evaluation. Patient reports snoring at night for the past 10+ years. Doesn't sleep in in supine position. Wakes self snoring. Has witnessed apnea. Wakes up at night choking and gasping for air. No restorative sleep. +dry mouth upon awakening. Fatigue and tiredness during the day. No history of sleep apnea. Bedtime 8 pm and rise time is 8--9 am. It takes 60 minutes to fall asleep- lays there. 4 nocturia. Wakes up 5-6 times at night. It takes unknown minutes to fall back a sleep. Takes 2-3 nap during the day ( 120 minutes). +headache in am.  No driving. Gained 50 pounds in the past 2 years. No forgetfulness or decreased concentration. Drinks 2 liter soda day and a pot of coffee  day. No alcohol. No restless feelings in legs at night.   No teeth grinding. +nightmares- takes prazosin with

## 2023-12-04 ENCOUNTER — TELEPHONE (OUTPATIENT)
Dept: PULMONOLOGY | Age: 48
End: 2023-12-04

## 2023-12-11 NOTE — TELEPHONE ENCOUNTER
Called [phone] to inform patient but a woman answered the phone and said she was at work and asked that I called back later to speak with him.  
Called again at [phone] and the same woman answered the phone. She said that she is at work and Fam does not have a phone. She says to call after 3:30 and she will be where he is.  
Please advise  
Recommend appointment to discuss barriers to BiPAP use/alternative treatment options.  Patient has very severe sleep apnea and associated hypoxia and treatment is strongly recommended  
intervention.    Complications of MANUELA if not treated were discussed with patient patient to include systemic hypertension, pulmonary hypertension, cardiovascular morbidities, car accidents and all cause mortality.  Discussed pathophysiology of MANUELA with patient today  Patient education handout provided regarding sleep tips   Recommend tobacco and marijuana cessation

## 2025-09-05 ENCOUNTER — OFFICE VISIT (OUTPATIENT)
Dept: SURGERY | Age: 50
End: 2025-09-05
Payer: COMMERCIAL

## 2025-09-05 VITALS
TEMPERATURE: 98.1 F | SYSTOLIC BLOOD PRESSURE: 132 MMHG | RESPIRATION RATE: 16 BRPM | DIASTOLIC BLOOD PRESSURE: 83 MMHG | OXYGEN SATURATION: 96 % | HEART RATE: 81 BPM

## 2025-09-05 DIAGNOSIS — B35.6 TINEA CRURIS: Primary | ICD-10-CM

## 2025-09-05 DIAGNOSIS — Z12.11 COLON CANCER SCREENING: ICD-10-CM

## 2025-09-05 PROCEDURE — 99203 OFFICE O/P NEW LOW 30 MIN: CPT | Performed by: SURGERY

## 2025-09-05 RX ORDER — TERBINAFINE HYDROCHLORIDE 250 MG/1
250 TABLET ORAL DAILY
Qty: 14 TABLET | Refills: 0 | Status: SHIPPED | OUTPATIENT
Start: 2025-09-05 | End: 2025-09-19

## 2025-09-05 RX ORDER — CICLOPIROX OLAMINE 7.7 MG/G
CREAM TOPICAL
Qty: 30 G | Refills: 2 | Status: SHIPPED | OUTPATIENT
Start: 2025-09-05